# Patient Record
Sex: FEMALE | Race: WHITE | NOT HISPANIC OR LATINO
[De-identification: names, ages, dates, MRNs, and addresses within clinical notes are randomized per-mention and may not be internally consistent; named-entity substitution may affect disease eponyms.]

---
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
## 2023-06-19 ENCOUNTER — APPOINTMENT (OUTPATIENT)
Dept: PEDIATRICS | Facility: CLINIC | Age: 18
End: 2023-06-19
Payer: COMMERCIAL

## 2023-06-19 VITALS — HEART RATE: 89 BPM | WEIGHT: 149.63 LBS | HEIGHT: 63 IN | BODY MASS INDEX: 26.51 KG/M2 | TEMPERATURE: 98.2 F

## 2023-06-19 VITALS — OXYGEN SATURATION: 98 %

## 2023-06-19 DIAGNOSIS — H10.33 UNSPECIFIED ACUTE CONJUNCTIVITIS, BILATERAL: ICD-10-CM

## 2023-06-19 DIAGNOSIS — J06.9 ACUTE UPPER RESPIRATORY INFECTION, UNSPECIFIED: ICD-10-CM

## 2023-06-19 DIAGNOSIS — R80.0 ISOLATED PROTEINURIA: ICD-10-CM

## 2023-06-19 DIAGNOSIS — J03.00 ACUTE STREPTOCOCCAL TONSILLITIS, UNSPECIFIED: ICD-10-CM

## 2023-06-19 DIAGNOSIS — Z87.2 PERSONAL HISTORY OF DISEASES OF THE SKIN AND SUBCUTANEOUS TISSUE: ICD-10-CM

## 2023-06-19 DIAGNOSIS — R05.9 COUGH, UNSPECIFIED: ICD-10-CM

## 2023-06-19 DIAGNOSIS — R50.81 FEVER PRESENTING WITH CONDITIONS CLASSIFIED ELSEWHERE: ICD-10-CM

## 2023-06-19 PROBLEM — Z00.00 ENCOUNTER FOR PREVENTIVE HEALTH EXAMINATION: Status: ACTIVE | Noted: 2023-06-19

## 2023-06-19 PROCEDURE — 99213 OFFICE O/P EST LOW 20 MIN: CPT

## 2023-06-19 NOTE — PHYSICAL EXAM
[Acute Distress] : no acute distress [Alert] : alert [Tired appearing] : not tired appearing [Tenderness] : no tenderness [NL] : grossly EOMI [Clear] : right tympanic membrane clear [Pink Nasal Mucosa] : pink nasal mucosa [Clear Rhinorrhea] : clear rhinorrhea [Inflamed Nasal Mucosa] : inflamed nasal mucosa [Erythematous Oropharynx] : nonerythematous oropharynx [Vesicles] : no vesicles [Exudate] : no exudate [Ulcerative Lesions] : no ulcerative lesions [Palate petechiae] : palate without petechiae [FROM] : full passive range of motion [Supple] : supple [Symmetric Chest Wall] : symmetric chest wall [Clear to Auscultation Bilaterally] : clear to auscultation bilaterally [Wheezing] : no wheezing [Rales] : no rales [Crackles] : no crackles [Transmitted Upper Airway Sounds] : no transmitted upper airway sounds [Tachypnea] : no tachypnea [Rhonchi] : no rhonchi [Subcostal Retractions] : no subcostal retractions [Suprasternal Retractions] : no suprasternal retractions [Regular Rate and Rhythm] : regular rate and rhythm [Normal S1, S2 audible] : normal S1, S2 audible [Murmur] : no murmur [Soft] : soft [Tender] : nontender [Distended] : nondistended [Normal Bowel Sounds] : normal bowel sounds [Hepatosplenomegaly] : no hepatosplenomegaly [No Abnormal Lymph Nodes Palpated] : no abnormal lymph nodes palpated [FreeTextEntry2] : no sinus tenderness on palpation

## 2023-06-19 NOTE — REVIEW OF SYSTEMS
[Fever] : no fever [Headache] : no headache [Eye Redness] : no eye redness [Nasal Congestion] : nasal congestion [Snoring] : no snoring [Sinus Pressure] : sinus pressure [Sore Throat] : no sore throat [Chest Pain] : no chest pain [Tachypnea] : not tachypneic [Wheezing] : no wheezing [Congestion] : congestion [Cough] : cough [Shortness of Breath] : no shortness of breath [Negative] : Skin

## 2023-06-19 NOTE — HISTORY OF PRESENT ILLNESS
[de-identified] : cough [FreeTextEntry6] : She has had a cough and recently finished a ten day course of Amoxil given to her by telehealth. Prior to that, while still at school, she was seen at school health and given an Albuterol inhaler which helped her a little. She still felt somewhat sick and had a cough when she got back home from her school, [employer], where she just finished her 2nd year of study. No fevers, no chills, no shortness of breath except on exertion, due to congested nose and cough with mucus. She denies having pain with breathing. She also had five strep throat infections during this school year, and her boyfriend had four. She also had Mononucleosis  October.